# Patient Record
Sex: MALE | Race: WHITE | Employment: FULL TIME | URBAN - METROPOLITAN AREA
[De-identification: names, ages, dates, MRNs, and addresses within clinical notes are randomized per-mention and may not be internally consistent; named-entity substitution may affect disease eponyms.]

---

## 2021-12-25 ENCOUNTER — APPOINTMENT (OUTPATIENT)
Dept: GENERAL RADIOLOGY | Age: 66
DRG: 175 | End: 2021-12-25
Payer: MEDICARE

## 2021-12-25 ENCOUNTER — HOSPITAL ENCOUNTER (INPATIENT)
Age: 66
LOS: 2 days | Discharge: HOME | DRG: 175 | End: 2021-12-27
Attending: EMERGENCY MEDICINE | Admitting: INTERNAL MEDICINE
Payer: MEDICARE

## 2021-12-25 ENCOUNTER — APPOINTMENT (OUTPATIENT)
Dept: CT IMAGING | Age: 66
DRG: 175 | End: 2021-12-25
Payer: MEDICARE

## 2021-12-25 DIAGNOSIS — I26.94 MULTIPLE SUBSEGMENTAL PULMONARY EMBOLI WITHOUT ACUTE COR PULMONALE (HCC): ICD-10-CM

## 2021-12-25 DIAGNOSIS — R06.02 SHORTNESS OF BREATH: Primary | ICD-10-CM

## 2021-12-25 PROBLEM — I26.99 PULMONARY EMBOLISM (HCC): Status: ACTIVE | Noted: 2021-12-25

## 2021-12-25 LAB
ALBUMIN SERPL-MCNC: 4.8 G/DL (ref 3.5–5.2)
ALP BLD-CCNC: 91 U/L (ref 40–129)
ALT SERPL-CCNC: 22 U/L (ref 0–40)
ANION GAP SERPL CALCULATED.3IONS-SCNC: 14 MMOL/L (ref 7–16)
AST SERPL-CCNC: 20 U/L (ref 0–39)
BASOPHILS ABSOLUTE: 0.05 E9/L (ref 0–0.2)
BASOPHILS RELATIVE PERCENT: 0.4 % (ref 0–2)
BILIRUB SERPL-MCNC: 0.4 MG/DL (ref 0–1.2)
BUN BLDV-MCNC: 15 MG/DL (ref 6–23)
CALCIUM SERPL-MCNC: 9.8 MG/DL (ref 8.6–10.2)
CHLORIDE BLD-SCNC: 98 MMOL/L (ref 98–107)
CO2: 26 MMOL/L (ref 22–29)
CREAT SERPL-MCNC: 1 MG/DL (ref 0.7–1.2)
EOSINOPHILS ABSOLUTE: 0.14 E9/L (ref 0.05–0.5)
EOSINOPHILS RELATIVE PERCENT: 1.1 % (ref 0–6)
GFR AFRICAN AMERICAN: >60
GFR NON-AFRICAN AMERICAN: >60 ML/MIN/1.73
GLUCOSE BLD-MCNC: 119 MG/DL (ref 74–99)
HCT VFR BLD CALC: 44.4 % (ref 37–54)
HEMOGLOBIN: 14.5 G/DL (ref 12.5–16.5)
IMMATURE GRANULOCYTES #: 0.05 E9/L
IMMATURE GRANULOCYTES %: 0.4 % (ref 0–5)
LIPASE: 20 U/L (ref 13–60)
LYMPHOCYTES ABSOLUTE: 2.61 E9/L (ref 1.5–4)
LYMPHOCYTES RELATIVE PERCENT: 20.6 % (ref 20–42)
MCH RBC QN AUTO: 29.4 PG (ref 26–35)
MCHC RBC AUTO-ENTMCNC: 32.7 % (ref 32–34.5)
MCV RBC AUTO: 90.1 FL (ref 80–99.9)
MONOCYTES ABSOLUTE: 1.27 E9/L (ref 0.1–0.95)
MONOCYTES RELATIVE PERCENT: 10 % (ref 2–12)
NEUTROPHILS ABSOLUTE: 8.56 E9/L (ref 1.8–7.3)
NEUTROPHILS RELATIVE PERCENT: 67.5 % (ref 43–80)
PDW BLD-RTO: 12.6 FL (ref 11.5–15)
PLATELET # BLD: 380 E9/L (ref 130–450)
PMV BLD AUTO: 9.8 FL (ref 7–12)
POTASSIUM REFLEX MAGNESIUM: 3.9 MMOL/L (ref 3.5–5)
PRO-BNP: 70 PG/ML (ref 0–125)
RBC # BLD: 4.93 E12/L (ref 3.8–5.8)
SARS-COV-2, NAAT: NOT DETECTED
SODIUM BLD-SCNC: 138 MMOL/L (ref 132–146)
TOTAL PROTEIN: 8 G/DL (ref 6.4–8.3)
TROPONIN, HIGH SENSITIVITY: 7 NG/L (ref 0–11)
TROPONIN, HIGH SENSITIVITY: 7 NG/L (ref 0–11)
WBC # BLD: 12.7 E9/L (ref 4.5–11.5)

## 2021-12-25 PROCEDURE — 6360000004 HC RX CONTRAST MEDICATION: Performed by: RADIOLOGY

## 2021-12-25 PROCEDURE — 2060000000 HC ICU INTERMEDIATE R&B

## 2021-12-25 PROCEDURE — 96376 TX/PRO/DX INJ SAME DRUG ADON: CPT

## 2021-12-25 PROCEDURE — 6370000000 HC RX 637 (ALT 250 FOR IP)

## 2021-12-25 PROCEDURE — 83880 ASSAY OF NATRIURETIC PEPTIDE: CPT

## 2021-12-25 PROCEDURE — 6360000002 HC RX W HCPCS

## 2021-12-25 PROCEDURE — 83690 ASSAY OF LIPASE: CPT

## 2021-12-25 PROCEDURE — 80053 COMPREHEN METABOLIC PANEL: CPT

## 2021-12-25 PROCEDURE — 36415 COLL VENOUS BLD VENIPUNCTURE: CPT

## 2021-12-25 PROCEDURE — 84484 ASSAY OF TROPONIN QUANT: CPT

## 2021-12-25 PROCEDURE — 2580000003 HC RX 258: Performed by: PHYSICIAN ASSISTANT

## 2021-12-25 PROCEDURE — 94664 DEMO&/EVAL PT USE INHALER: CPT

## 2021-12-25 PROCEDURE — 71275 CT ANGIOGRAPHY CHEST: CPT

## 2021-12-25 PROCEDURE — 99284 EMERGENCY DEPT VISIT MOD MDM: CPT

## 2021-12-25 PROCEDURE — 71045 X-RAY EXAM CHEST 1 VIEW: CPT

## 2021-12-25 PROCEDURE — 87635 SARS-COV-2 COVID-19 AMP PRB: CPT

## 2021-12-25 PROCEDURE — 96372 THER/PROPH/DIAG INJ SC/IM: CPT

## 2021-12-25 PROCEDURE — 6370000000 HC RX 637 (ALT 250 FOR IP): Performed by: PHYSICIAN ASSISTANT

## 2021-12-25 PROCEDURE — 85025 COMPLETE CBC W/AUTO DIFF WBC: CPT

## 2021-12-25 PROCEDURE — 96374 THER/PROPH/DIAG INJ IV PUSH: CPT

## 2021-12-25 PROCEDURE — 94640 AIRWAY INHALATION TREATMENT: CPT

## 2021-12-25 RX ORDER — LEVOTHYROXINE SODIUM 112 UG/1
112 TABLET ORAL DAILY
Status: DISCONTINUED | OUTPATIENT
Start: 2021-12-26 | End: 2021-12-27 | Stop reason: HOSPADM

## 2021-12-25 RX ORDER — SODIUM CHLORIDE 0.9 % (FLUSH) 0.9 %
10 SYRINGE (ML) INJECTION PRN
Status: DISCONTINUED | OUTPATIENT
Start: 2021-12-25 | End: 2021-12-27 | Stop reason: HOSPADM

## 2021-12-25 RX ORDER — ESOMEPRAZOLE MAGNESIUM 20 MG/1
20 FOR SUSPENSION ORAL DAILY
COMMUNITY

## 2021-12-25 RX ORDER — HYDRALAZINE HYDROCHLORIDE 20 MG/ML
10 INJECTION INTRAMUSCULAR; INTRAVENOUS EVERY 6 HOURS PRN
Status: DISCONTINUED | OUTPATIENT
Start: 2021-12-25 | End: 2021-12-27 | Stop reason: HOSPADM

## 2021-12-25 RX ORDER — SODIUM CHLORIDE 0.9 % (FLUSH) 0.9 %
10 SYRINGE (ML) INJECTION EVERY 12 HOURS SCHEDULED
Status: DISCONTINUED | OUTPATIENT
Start: 2021-12-25 | End: 2021-12-27 | Stop reason: HOSPADM

## 2021-12-25 RX ORDER — ROSUVASTATIN CALCIUM 10 MG/1
10 TABLET, COATED ORAL DAILY
Status: DISCONTINUED | OUTPATIENT
Start: 2021-12-25 | End: 2021-12-27 | Stop reason: HOSPADM

## 2021-12-25 RX ORDER — ASPIRIN 81 MG/1
81 TABLET, CHEWABLE ORAL DAILY
Status: DISCONTINUED | OUTPATIENT
Start: 2021-12-25 | End: 2021-12-27 | Stop reason: HOSPADM

## 2021-12-25 RX ORDER — ACETAMINOPHEN 325 MG/1
650 TABLET ORAL EVERY 6 HOURS PRN
Status: DISCONTINUED | OUTPATIENT
Start: 2021-12-25 | End: 2021-12-27 | Stop reason: HOSPADM

## 2021-12-25 RX ORDER — OMEGA-3 FATTY ACIDS CAP DELAYED RELEASE 1000 MG 1000 MG
1000 CAPSULE DELAYED RELEASE ORAL
COMMUNITY

## 2021-12-25 RX ORDER — FENTANYL CITRATE 50 UG/ML
50 INJECTION, SOLUTION INTRAMUSCULAR; INTRAVENOUS ONCE
Status: COMPLETED | OUTPATIENT
Start: 2021-12-25 | End: 2021-12-25

## 2021-12-25 RX ORDER — FENTANYL CITRATE 50 UG/ML
INJECTION, SOLUTION INTRAMUSCULAR; INTRAVENOUS
Status: COMPLETED
Start: 2021-12-25 | End: 2021-12-25

## 2021-12-25 RX ORDER — ROSUVASTATIN CALCIUM 10 MG/1
10 TABLET, COATED ORAL DAILY
COMMUNITY

## 2021-12-25 RX ORDER — IPRATROPIUM BROMIDE AND ALBUTEROL SULFATE 2.5; .5 MG/3ML; MG/3ML
1 SOLUTION RESPIRATORY (INHALATION)
Status: COMPLETED | OUTPATIENT
Start: 2021-12-25 | End: 2021-12-25

## 2021-12-25 RX ORDER — SODIUM CHLORIDE 9 MG/ML
25 INJECTION, SOLUTION INTRAVENOUS PRN
Status: DISCONTINUED | OUTPATIENT
Start: 2021-12-25 | End: 2021-12-27 | Stop reason: HOSPADM

## 2021-12-25 RX ORDER — BUDESONIDE AND FORMOTEROL FUMARATE DIHYDRATE 80; 4.5 UG/1; UG/1
2 AEROSOL RESPIRATORY (INHALATION) 2 TIMES DAILY
Status: DISCONTINUED | OUTPATIENT
Start: 2021-12-25 | End: 2021-12-27 | Stop reason: HOSPADM

## 2021-12-25 RX ORDER — LEVOTHYROXINE SODIUM 112 UG/1
112 TABLET ORAL DAILY
COMMUNITY

## 2021-12-25 RX ORDER — POTASSIUM CHLORIDE 7.45 MG/ML
10 INJECTION INTRAVENOUS PRN
Status: DISCONTINUED | OUTPATIENT
Start: 2021-12-25 | End: 2021-12-27 | Stop reason: HOSPADM

## 2021-12-25 RX ORDER — BUDESONIDE AND FORMOTEROL FUMARATE DIHYDRATE 80; 4.5 UG/1; UG/1
2 AEROSOL RESPIRATORY (INHALATION) 2 TIMES DAILY
COMMUNITY

## 2021-12-25 RX ORDER — ACETAMINOPHEN 650 MG/1
650 SUPPOSITORY RECTAL EVERY 6 HOURS PRN
Status: DISCONTINUED | OUTPATIENT
Start: 2021-12-25 | End: 2021-12-27 | Stop reason: HOSPADM

## 2021-12-25 RX ORDER — LANOLIN ALCOHOL/MO/W.PET/CERES
3 CREAM (GRAM) TOPICAL NIGHTLY PRN
Status: DISCONTINUED | OUTPATIENT
Start: 2021-12-25 | End: 2021-12-27 | Stop reason: HOSPADM

## 2021-12-25 RX ORDER — POTASSIUM CHLORIDE 20 MEQ/1
40 TABLET, EXTENDED RELEASE ORAL PRN
Status: DISCONTINUED | OUTPATIENT
Start: 2021-12-25 | End: 2021-12-27 | Stop reason: HOSPADM

## 2021-12-25 RX ORDER — ASPIRIN 81 MG/1
81 TABLET, CHEWABLE ORAL DAILY
COMMUNITY

## 2021-12-25 RX ADMIN — IPRATROPIUM BROMIDE AND ALBUTEROL SULFATE 1 AMPULE: .5; 3 SOLUTION RESPIRATORY (INHALATION) at 14:25

## 2021-12-25 RX ADMIN — IPRATROPIUM BROMIDE AND ALBUTEROL SULFATE 1 AMPULE: .5; 3 SOLUTION RESPIRATORY (INHALATION) at 14:24

## 2021-12-25 RX ADMIN — FENTANYL CITRATE 50 MCG: 50 INJECTION, SOLUTION INTRAMUSCULAR; INTRAVENOUS at 12:01

## 2021-12-25 RX ADMIN — FENTANYL CITRATE 50 MCG: 50 INJECTION INTRAMUSCULAR; INTRAVENOUS at 12:01

## 2021-12-25 RX ADMIN — FENTANYL CITRATE 50 MCG: 50 INJECTION INTRAMUSCULAR; INTRAVENOUS at 12:41

## 2021-12-25 RX ADMIN — Medication 10 ML: at 20:59

## 2021-12-25 RX ADMIN — ROSUVASTATIN CALCIUM 10 MG: 10 TABLET, FILM COATED ORAL at 20:59

## 2021-12-25 RX ADMIN — IPRATROPIUM BROMIDE AND ALBUTEROL SULFATE 1 AMPULE: .5; 3 SOLUTION RESPIRATORY (INHALATION) at 14:23

## 2021-12-25 RX ADMIN — ENOXAPARIN SODIUM 100 MG: 100 INJECTION SUBCUTANEOUS at 13:11

## 2021-12-25 RX ADMIN — IOPAMIDOL 80 ML: 755 INJECTION, SOLUTION INTRAVENOUS at 12:18

## 2021-12-25 RX ADMIN — BUDESONIDE AND FORMOTEROL FUMARATE DIHYDRATE 2 PUFF: 80; 4.5 AEROSOL RESPIRATORY (INHALATION) at 22:58

## 2021-12-25 ASSESSMENT — ENCOUNTER SYMPTOMS
SHORTNESS OF BREATH: 1
ABDOMINAL DISTENTION: 0
BACK PAIN: 0
ABDOMINAL PAIN: 0
NAUSEA: 0
TROUBLE SWALLOWING: 0
EYE REDNESS: 0
DIARRHEA: 0
VOMITING: 0
WHEEZING: 0
CONSTIPATION: 0
CHEST TIGHTNESS: 0
RHINORRHEA: 0
EYE ITCHING: 0

## 2021-12-25 ASSESSMENT — PAIN DESCRIPTION - LOCATION: LOCATION: CHEST

## 2021-12-25 ASSESSMENT — PAIN SCALES - GENERAL
PAINLEVEL_OUTOF10: 10

## 2021-12-25 ASSESSMENT — PAIN DESCRIPTION - PAIN TYPE: TYPE: ACUTE PAIN

## 2021-12-25 ASSESSMENT — PAIN DESCRIPTION - ORIENTATION: ORIENTATION: LEFT

## 2021-12-25 NOTE — ED NOTES
Radiology Procedure Waiver   Name: Erika Dawson  : 1955  MRN: [id number]    Date:  21    Time: 12:03 PM EST    Benefits of immediately proceeding with Radiology exam(s) without pre-testing outweigh the risks or are not indicated as specified below and therefore the following is/are being waived:    [] Pregnancy test   [] Patients LMP on-time and regular.   [] Patient had Tubal Ligation or has other Contraception Device. [] Patient  is Menopausal or Premenarcheal.    [] Patient had Full or Partial Hysterectomy. [] Protocol for Iodine allergy    [] MRI Questionnaire     [] BUN/Creatinine   [x] Patient age w/no hx of renal dysfunction. [] Patient on Dialysis. [] Recent Normal Labs.   Electronically signed by Stephanie Nation DO on 21 at 12:03 PM EST          Emergent CTA for respiratory distress     Stephanie Nation DO  [phone]

## 2021-12-25 NOTE — ED PROVIDER NOTES
Isabel Merritt is a 73 y.o. male    Chief Complaint   Patient presents with    Chest Pain     rapid onset at approximately 1000. pain is worse with inspiration and breathing    Shortness of Breath         HPI   Isabel Merritt is a 73 y.o. male presenting to the ED for Chest Pain (rapid onset at approximately 1000. pain is worse with inspiration and breathing) and Shortness of Breath    Patient presented to the emergency room with sudden onset chest pain starting around 10 AM this morning. The pain is inferior to the pectoral muscle and slightly lateral.  Pain is worse with inspiration and movements. Patient is frequently tensing his entire body up during his spasms of pain. He is breathing rapidly though he is generally able to answer questions. This is never happened to the patient before in general he has no significant medical problems related to chest pain shortness of breath. He has never had blood clots before, no recent travel or periods of immobility. Patient has a history of prostate cancer, thyroid disease. Symptoms began today with severe severity, and is persistent. No other associated symptoms no other modifying factors. History comes primarily from the patient. On arrival, the patient was assessed by history, physical exam, imaging studies, laboratory studies and ekg, vital signs. Vital signs were hypertensive and the patient was afebrile. Review of Systems   Constitutional: Negative for appetite change, fatigue and fever. HENT: Negative for congestion, rhinorrhea and trouble swallowing. Eyes: Negative for redness and itching. Respiratory: Positive for shortness of breath. Negative for chest tightness and wheezing. Cardiovascular: Positive for chest pain. Negative for palpitations and leg swelling. Gastrointestinal: Negative for abdominal distention, abdominal pain, constipation, diarrhea, nausea and vomiting.    Genitourinary: Negative for decreased urine volume, difficulty urinating and frequency. Musculoskeletal: Negative for arthralgias, back pain and myalgias. Neurological: Negative for dizziness, syncope, weakness, numbness and headaches. Psychiatric/Behavioral: Negative for agitation, behavioral problems, confusion and decreased concentration. The patient is not nervous/anxious. All other systems reviewed and are negative. Physical Exam  Vitals reviewed. Constitutional:       General: He is in acute distress. Appearance: Normal appearance. He is well-developed. He is not ill-appearing or toxic-appearing. HENT:      Head: Normocephalic and atraumatic. Nose: Nose normal. No congestion or rhinorrhea. Mouth/Throat:      Mouth: Mucous membranes are moist.      Pharynx: Oropharynx is clear. No oropharyngeal exudate or posterior oropharyngeal erythema. Eyes:      Extraocular Movements: Extraocular movements intact. Conjunctiva/sclera: Conjunctivae normal.      Pupils: Pupils are equal, round, and reactive to light. Cardiovascular:      Rate and Rhythm: Normal rate and regular rhythm. Heart sounds: Normal heart sounds. No murmur heard. Pulmonary:      Effort: Tachypnea, accessory muscle usage and respiratory distress present. Breath sounds: Decreased breath sounds and wheezing present. No rhonchi. Chest:      Chest wall: Tenderness present. Abdominal:      General: Abdomen is flat. There is no distension. Tenderness: There is no abdominal tenderness. There is no guarding. Musculoskeletal:         General: No swelling or tenderness. Normal range of motion. Cervical back: Normal range of motion. No rigidity or tenderness. Skin:     General: Skin is warm and dry. Coloration: Skin is not jaundiced or pale. Findings: No bruising or erythema. Neurological:      General: No focal deficit present. Mental Status: He is alert and oriented to person, place, and time. Cranial Nerves:  No cranial nerve deficit. Motor: No weakness. Psychiatric:         Mood and Affect: Mood normal.         Behavior: Behavior normal.         Thought Content: Thought content normal.          Procedures     MDM   Patient presented to the Emergency Department for Chest Pain (rapid onset at approximately 1000. pain is worse with inspiration and breathing) and Shortness of Breath    Patient presents with sudden onset chest pain which started at 10 AM this morning associated with significant shortness of breath. Labs:  Patient's labs are unremarkable including negative troponin and BNP. They are otherwise unremarkable except for mildly elevated white count of 12.7    Imaging:  Chest x-ray shows no acute process. CTA shows small subsegmental pulmonary embolisms. While in the emergency room, the patient received 250 mcg doses of fentanyl for his pain which seems significant and was not being relieved. He also received a DuoNeb for his breathing and after discovery of her pulmonary embolism was given a Lovenox injection. The patient is generally more comfortable now, breathing much easier and not in nearly as much distress. EKG was done and seen by the attending. ED Course as of 12/25/21 1924   Sat Dec 25, 2021   1235 Presents from home with chest pain starting this morning. The patient points to approximately mid axillary line at the level of the xiphoid process. The pain is worse with movement and inspiration. The pain has progressively worsened throughout the morning. [KS]      ED Course User Index  [KS] Dolly Quevedo MD       --------------------------------------------- PAST HISTORY ---------------------------------------------  Past Medical History:  has a past medical history of Cancer ([facility] [address] Hyperlipidemia, and Thyroid disease. Past Surgical History:  has no past surgical history on file. Social History:  reports that he has never smoked.  He has never used smokeless tobacco. He reports that he does not drink alcohol and does not use drugs. Family History: family history is not on file. The patients home medications have been reviewed. Allergies: Patient has no known allergies.     -------------------------------------------------- RESULTS -------------------------------------------------    LABS:  Results for orders placed or performed during the hospital encounter of 12/25/21   CBC Auto Differential   Result Value Ref Range    WBC 12.7 (H) 4.5 - 11.5 E9/L    RBC 4.93 3.80 - 5.80 E12/L    Hemoglobin 14.5 12.5 - 16.5 g/dL    Hematocrit 44.4 37.0 - 54.0 %    MCV 90.1 80.0 - 99.9 fL    MCH 29.4 26.0 - 35.0 pg    MCHC 32.7 32.0 - 34.5 %    RDW 12.6 11.5 - 15.0 fL    Platelets 451 158 - 320 E9/L    MPV 9.8 7.0 - 12.0 fL    Neutrophils % 67.5 43.0 - 80.0 %    Immature Granulocytes % 0.4 0.0 - 5.0 %    Lymphocytes % 20.6 20.0 - 42.0 %    Monocytes % 10.0 2.0 - 12.0 %    Eosinophils % 1.1 0.0 - 6.0 %    Basophils % 0.4 0.0 - 2.0 %    Neutrophils Absolute 8.56 (H) 1.80 - 7.30 E9/L    Immature Granulocytes # 0.05 E9/L    Lymphocytes Absolute 2.61 1.50 - 4.00 E9/L    Monocytes Absolute 1.27 (H) 0.10 - 0.95 E9/L    Eosinophils Absolute 0.14 0.05 - 0.50 E9/L    Basophils Absolute 0.05 0.00 - 0.20 E9/L   Comprehensive Metabolic Panel w/ Reflex to MG   Result Value Ref Range    Sodium 138 132 - 146 mmol/L    Potassium reflex Magnesium 3.9 3.5 - 5.0 mmol/L    Chloride 98 98 - 107 mmol/L    CO2 26 22 - 29 mmol/L    Anion Gap 14 7 - 16 mmol/L    Glucose 119 (H) 74 - 99 mg/dL    BUN 15 6 - 23 mg/dL    CREATININE 1.0 0.7 - 1.2 mg/dL    GFR Non-African American >60 >=60 mL/min/1.73    GFR African American >60     Calcium 9.8 8.6 - 10.2 mg/dL    Total Protein 8.0 6.4 - 8.3 g/dL    Albumin 4.8 3.5 - 5.2 g/dL    Total Bilirubin 0.4 0.0 - 1.2 mg/dL    Alkaline Phosphatase 91 40 - 129 U/L    ALT 22 0 - 40 U/L    AST 20 0 - 39 U/L   Lipase   Result Value Ref Range    Lipase 20 13 - 60 U/L   Troponin Result Value Ref Range    Troponin, High Sensitivity 7 0 - 11 ng/L   Brain Natriuretic Peptide   Result Value Ref Range    Pro-BNP 70 0 - 125 pg/mL   Troponin   Result Value Ref Range    Troponin, High Sensitivity 7 0 - 11 ng/L       RADIOLOGY:  CTA PULMONARY W CONTRAST   Final Result   1. Findings suspicious for small pulmonary emboli in subsegmental branches of   the right and left lower lobe pulmonary arteries. 2. Right hilar and subcarinal lymphadenopathy. This could be reactive or   related to metastatic disease. Correlation with PET-CT scan may be helpful. 3. Multiple ground-glass opacities in the right lung as described above with   a small 1.2 cm part solid nodule in the superior segment of the right lower   lobe. The findings are suspicious for inflammatory process. The possibility   of post radiation changes could also be considered. Possibility of COVID-19   pneumonia should also be considered although this appearance is not typical   for COVID-19 changes. 4. The 1.2 cm nodule in the superior segment of the right lower lobe may be   inflammatory although metastatic disease could also be considered. This   could also be evaluated with PET-CT. 5. 2 low-density lesions in the liver measuring 1.7 cm that could represent   metastatic disease. 6. Tiny left pleural effusion. Mild atelectasis in the left lower lobe and   lingula. RECOMMENDATIONS:   Unavailable         XR CHEST PORTABLE   Final Result   No acute process. ------------------------- NURSING NOTES AND VITALS REVIEWED ---------------------------  Date / Time Roomed:  12/25/2021 11:53 AM  ED Bed Assignment:  20/20    The nursing notes within the ED encounter and vital signs as below have been reviewed.      Patient Vitals for the past 24 hrs:   BP Temp Temp src Pulse Resp SpO2 Height Weight   12/25/21 1434 (!) 145/83   86  97 %     12/25/21 1425      96 %     12/25/21 1424      96 %     12/25/21 Osvaldo Ingram 42 %     12/25/21 1242 (!) 159/93   88 24 92 %     12/25/21 1219 (!) 159/93 99.1 °F (37.3 °C) Oral 94 26 94 % 5' 9\" (1.753 m) 215 lb (97.5 kg)       Oxygen Saturation Interpretation: Normal    ------------------------------------------ PROGRESS NOTES ------------------------------------------  Re-evaluation(s):  Time: Multiple reevaluation patients symptoms are improving  Repeat physical examination is improved    Counseling:  I have spoken with the patient and discussed todays results, in addition to providing specific details for the plan of care and counseling regarding the diagnosis and prognosis. Their questions are answered at this time and they are agreeable with the plan of admission.    --------------------------------- ADDITIONAL PROVIDER NOTES ---------------------------------  Consultations:  Time: 330. Spoke with Andrés. Discussed case. They will admit the patient. This patient's ED course included: a personal history and physicial examination, multiple bedside re-evaluations, IV medications, cardiac monitoring, continuous pulse oximetry and complex medical decision making and emergency management    This patient has remained hemodynamically stable during their ED course. Diagnosis:  1. Shortness of breath    2. Multiple subsegmental pulmonary emboli without acute cor pulmonale (HCC)        Disposition:  Patient's disposition: Admit to med/surg floor with observation  Patient's condition is stable.            Angeline Cuba MD  Resident  12/28/21 [id number]

## 2021-12-26 ENCOUNTER — APPOINTMENT (OUTPATIENT)
Dept: ULTRASOUND IMAGING | Age: 66
DRG: 175 | End: 2021-12-26
Payer: MEDICARE

## 2021-12-26 LAB
ADENOVIRUS BY PCR: NOT DETECTED
ANION GAP SERPL CALCULATED.3IONS-SCNC: 13 MMOL/L (ref 7–16)
BASOPHILS ABSOLUTE: 0.03 E9/L (ref 0–0.2)
BASOPHILS RELATIVE PERCENT: 0.3 % (ref 0–2)
BORDETELLA PARAPERTUSSIS BY PCR: NOT DETECTED
BORDETELLA PERTUSSIS BY PCR: NOT DETECTED
BUN BLDV-MCNC: 12 MG/DL (ref 6–23)
CALCIUM SERPL-MCNC: 9.3 MG/DL (ref 8.6–10.2)
CHLAMYDOPHILIA PNEUMONIAE BY PCR: NOT DETECTED
CHLORIDE BLD-SCNC: 98 MMOL/L (ref 98–107)
CO2: 25 MMOL/L (ref 22–29)
CORONAVIRUS 229E BY PCR: NOT DETECTED
CORONAVIRUS HKU1 BY PCR: NOT DETECTED
CORONAVIRUS NL63 BY PCR: NOT DETECTED
CORONAVIRUS OC43 BY PCR: NOT DETECTED
CREAT SERPL-MCNC: 0.9 MG/DL (ref 0.7–1.2)
EOSINOPHILS ABSOLUTE: 0.06 E9/L (ref 0.05–0.5)
EOSINOPHILS RELATIVE PERCENT: 0.6 % (ref 0–6)
GFR AFRICAN AMERICAN: >60
GFR NON-AFRICAN AMERICAN: >60 ML/MIN/1.73
GLUCOSE BLD-MCNC: 137 MG/DL (ref 74–99)
HCT VFR BLD CALC: 40.1 % (ref 37–54)
HEMOGLOBIN: 13.4 G/DL (ref 12.5–16.5)
HUMAN METAPNEUMOVIRUS BY PCR: NOT DETECTED
HUMAN RHINOVIRUS/ENTEROVIRUS BY PCR: NOT DETECTED
IMMATURE GRANULOCYTES #: 0.03 E9/L
IMMATURE GRANULOCYTES %: 0.3 % (ref 0–5)
INFLUENZA A BY PCR: NOT DETECTED
INFLUENZA B BY PCR: NOT DETECTED
LYMPHOCYTES ABSOLUTE: 1.44 E9/L (ref 1.5–4)
LYMPHOCYTES RELATIVE PERCENT: 13.2 % (ref 20–42)
MCH RBC QN AUTO: 29.9 PG (ref 26–35)
MCHC RBC AUTO-ENTMCNC: 33.4 % (ref 32–34.5)
MCV RBC AUTO: 89.5 FL (ref 80–99.9)
MONOCYTES ABSOLUTE: 1.33 E9/L (ref 0.1–0.95)
MONOCYTES RELATIVE PERCENT: 12.2 % (ref 2–12)
MYCOPLASMA PNEUMONIAE BY PCR: NOT DETECTED
NEUTROPHILS ABSOLUTE: 8.01 E9/L (ref 1.8–7.3)
NEUTROPHILS RELATIVE PERCENT: 73.4 % (ref 43–80)
PARAINFLUENZA VIRUS 1 BY PCR: NOT DETECTED
PARAINFLUENZA VIRUS 2 BY PCR: NOT DETECTED
PARAINFLUENZA VIRUS 3 BY PCR: NOT DETECTED
PARAINFLUENZA VIRUS 4 BY PCR: NOT DETECTED
PDW BLD-RTO: 12.9 FL (ref 11.5–15)
PLATELET # BLD: 314 E9/L (ref 130–450)
PMV BLD AUTO: 9.8 FL (ref 7–12)
POTASSIUM REFLEX MAGNESIUM: 4.3 MMOL/L (ref 3.5–5)
RBC # BLD: 4.48 E12/L (ref 3.8–5.8)
RESPIRATORY SYNCYTIAL VIRUS BY PCR: NOT DETECTED
SARS-COV-2, PCR: NOT DETECTED
SODIUM BLD-SCNC: 136 MMOL/L (ref 132–146)
WBC # BLD: 10.9 E9/L (ref 4.5–11.5)

## 2021-12-26 PROCEDURE — 93970 EXTREMITY STUDY: CPT

## 2021-12-26 PROCEDURE — 2700000000 HC OXYGEN THERAPY PER DAY

## 2021-12-26 PROCEDURE — 6360000002 HC RX W HCPCS: Performed by: PHYSICIAN ASSISTANT

## 2021-12-26 PROCEDURE — 6370000000 HC RX 637 (ALT 250 FOR IP): Performed by: PHYSICIAN ASSISTANT

## 2021-12-26 PROCEDURE — 82378 CARCINOEMBRYONIC ANTIGEN: CPT

## 2021-12-26 PROCEDURE — 80048 BASIC METABOLIC PNL TOTAL CA: CPT

## 2021-12-26 PROCEDURE — 86301 IMMUNOASSAY TUMOR CA 19-9: CPT

## 2021-12-26 PROCEDURE — 2060000000 HC ICU INTERMEDIATE R&B

## 2021-12-26 PROCEDURE — 85025 COMPLETE CBC W/AUTO DIFF WBC: CPT

## 2021-12-26 PROCEDURE — 2580000003 HC RX 258: Performed by: PHYSICIAN ASSISTANT

## 2021-12-26 PROCEDURE — 0202U NFCT DS 22 TRGT SARS-COV-2: CPT

## 2021-12-26 PROCEDURE — 36415 COLL VENOUS BLD VENIPUNCTURE: CPT

## 2021-12-26 PROCEDURE — 84153 ASSAY OF PSA TOTAL: CPT

## 2021-12-26 RX ADMIN — ROSUVASTATIN CALCIUM 10 MG: 10 TABLET, FILM COATED ORAL at 09:04

## 2021-12-26 RX ADMIN — Medication 10 ML: at 09:04

## 2021-12-26 RX ADMIN — BUDESONIDE AND FORMOTEROL FUMARATE DIHYDRATE 2 PUFF: 80; 4.5 AEROSOL RESPIRATORY (INHALATION) at 09:04

## 2021-12-26 RX ADMIN — Medication 10 ML: at 20:23

## 2021-12-26 RX ADMIN — ENOXAPARIN SODIUM 100 MG: 100 INJECTION SUBCUTANEOUS at 09:04

## 2021-12-26 RX ADMIN — ENOXAPARIN SODIUM 100 MG: 100 INJECTION SUBCUTANEOUS at 00:15

## 2021-12-26 RX ADMIN — ASPIRIN 81 MG CHEWABLE TABLET 81 MG: 81 TABLET CHEWABLE at 09:04

## 2021-12-26 RX ADMIN — ENOXAPARIN SODIUM 100 MG: 100 INJECTION SUBCUTANEOUS at 20:23

## 2021-12-26 RX ADMIN — LEVOTHYROXINE SODIUM 112 MCG: 0.11 TABLET ORAL at 05:58

## 2021-12-26 RX ADMIN — BUDESONIDE AND FORMOTEROL FUMARATE DIHYDRATE 2 PUFF: 80; 4.5 AEROSOL RESPIRATORY (INHALATION) at 20:23

## 2021-12-26 ASSESSMENT — PAIN SCALES - GENERAL
PAINLEVEL_OUTOF10: 0
PAINLEVEL_OUTOF10: 2
PAINLEVEL_OUTOF10: 0
PAINLEVEL_OUTOF10: 0

## 2021-12-26 NOTE — PLAN OF CARE
Problem: Tissue Perfusion - Cardiopulmonary, Altered:  Goal: Absence of angina  Description: Absence of angina  Outcome: Ongoing  Goal: Hemodynamic stability will improve  Description: Hemodynamic stability will improve  Outcome: Ongoing

## 2021-12-26 NOTE — CONSULTS
Pulmonary Consultation    Admit Date: 12/25/2021  Requesting Physician: Felicita Daly MD    CC:  Chest pressure    HPI:  73 YOM, recently new patient to Dr. Earl Lockwood. Was sent from PCP to lung cancer screening scan. Abnormal with nodules 1.2cm RLL and node right neck. Was evaluated by Dr. Debbie Sampson chest 8/23/21 3 nodules on right 10.8,11.2, and 1 pleural nodule. Paratracheal lymph node 33.3x18.6 and right hilar lymph node 24.8x15.3, and enlarged mediastinal nodes. Ordered and completed a PET 10/29/21 uptake + superior mediastinal, paratracheal and cervical chain. PFT with moderate obstruction and + BD. Patient seen Dr. Natan Khan and had a biopsy one week ago at [facility]-no results as of yet per patient and I am unable to visualize records . He was started on Symbicort one month ago and does notice a difference in his breathing . No oxygen use. Smoked for 40+ years and stopped 5 months ago. 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 He is vaccinated x 3. Marries with one daughter in [address] [place] [address] Road and a step daughter with autism . He lives in [place], [place]. H/o prostate cancer s/p resection    He developed chest tightness Elmer Keith. Went to visit his mother in [facility] day, chest tightness progressed and worsened to left chest. With the inability to take a deep breath . He had his LIZETTE bring him to closest hospital here, [facility].     He is more comfortable today, minimal chest discomfort on 2L O2,      PMH:    Past Medical History:   Diagnosis Date    Cancer [facility])     prostate     Hyperlipidemia     Thyroid disease      PSH:  History reviewed. No pertinent surgical history. Respiratory ROS: intermittent left sided chest pain and decreased inspiratory effort Otherwise, a complete review of systems is undertaken and is negative.     Social History:  Social History     Socioeconomic History    Marital status:  Spouse name: Not on file    Number of children: Not on file    Years of education: Not on file    Highest education level: Not on file   Occupational History    Not on file   Tobacco Use    Smoking status: Never Smoker    Smokeless tobacco: Never Used   Substance and Sexual Activity    Alcohol use: Never    Drug use: Never    Sexual activity: Not on file   Other Topics Concern    Not on file   Social History Narrative    Not on file     Social Determinants of Health     Financial Resource Strain:     Difficulty of Paying Living Expenses: Not on file   Food Insecurity:     Worried About Running Out of Food in the Last Year: Not on file    Titi of Food in the Last Year: Not on file   Transportation Needs:     Lack of Transportation (Medical): Not on file    Lack of Transportation (Non-Medical):  Not on file   Physical Activity:     Days of Exercise per Week: Not on file    Minutes of Exercise per Session: Not on file   Stress:     Feeling of Stress : Not on file   Social Connections:     Frequency of Communication with Friends and Family: Not on file    Frequency of Social Gatherings with Friends and Family: Not on file    Attends Zoroastrian Services: Not on file    Active Member of [address] [place] [address] or Organizations: Not on file    Attends Club or Organization Meetings: Not on file    Marital Status: Not on file   Intimate Partner Violence:     Fear of Current or Ex-Partner: Not on file    Emotionally Abused: Not on file    Physically Abused: Not on file    Sexually Abused: Not on file   Housing Stability:     Unable to Pay for Housing in the Last Year: Not on file    Number of [place] in the Last Year: Not on file    Unstable Housing in the Last Year: Not on file       Family History:  Mom and dad with skin cancer  Aunt with lung cancer     Medications:     sodium chloride        aspirin  81 mg Oral Daily    budesonide-formoterol  2 puff Inhalation BID    levothyroxine  112 mcg Oral Daily    rosuvastatin  10 mg Oral Daily    sodium chloride flush  10 mL IntraVENous 2 times per day    enoxaparin  1 mg/kg SubCUTAneous BID         Vitals:    VITALS:  /71   Pulse 69   Temp 98 °F (36.7 °C) (Oral)   Resp 18   Ht 5' 9\" (1.753 m)   Wt 215 lb (97.5 kg)   SpO2 96%   BMI 31.75 kg/m²   24HR INTAKE/OUTPUT:  No intake or output data in the 24 hours ending 21 1025  CURRENT PULSE OXIMETRY:  SpO2: 96 %  24HR PULSE OXIMETRY RANGE:  SpO2  Av.1 %  Min: 92 %  Max: 97 %      EXAM:  General: No distress. Eyes: PERRL. No sclera icterus. No conjunctival injection. ENT: No discharge. Pharynx clear.+ dentures   Neck: Trachea midline. Normal thyroid. Resp: No accessory muscle use. No crackles. No wheezing. No rhonchi. diminished > left base   CV: Regular rate. Regular rhythm. No mumur or rub. ABD: Non-tender. Non-distended. No masses. No organmegaly. Normal bowel sounds. Skin: Warm and dry. No nodule on exposed extremities. No rash on exposed extremities. Lymph: No cervical LAD. No supraclavicular LAD. Ext: No joint deformity. No clubbing. No cyanosis. No edema  Neuro: Awake. Follows commands ox3, SCOTT, appropriate      Lab Results:  CBC:   Recent Labs     21  1204 21  0350   WBC 12.7* 10.9   HGB 14.5 13.4   HCT 44.4 40.1   MCV 90.1 89.5    314     BMP:   Recent Labs     21  1204 21  0350    136   K 3.9 4.3   CL 98 98   CO2 26 25   BUN 15 12   CREATININE 1.0 0.9     LFT:   Recent Labs     21  1204   ALKPHOS 91   ALT 22   AST 20   PROT 8.0   BILITOT 0.4   LABALBU 4.8     PT/INR: No results for input(s): PROTIME, INR in the last 72 hours. Cultures:  Results for Ara Luna (MRN [id number]) as of 2021 10:37   Ref. Range 2021 20:35   SARS-CoV-2, NAAT Latest Ref Range: Not Detected  Not Detected       ABG:   No results for input(s): PH, PO2, PCO2, HCO3, BE, O2SAT in the last 72 hours.     Films:  XR CHEST PORTABLE    Result Date: 12/25/2021  EXAMINATION: ONE XRAY VIEW OF THE CHEST 12/25/2021 11:54 am COMPARISON: None. HISTORY: ORDERING SYSTEM PROVIDED HISTORY: cp TECHNOLOGIST PROVIDED HISTORY: Reason for exam:->cp FINDINGS: The lungs are without acute focal process. There is no effusion or pneumothorax. The cardiomediastinal silhouette is without acute process. The osseous structures are without acute process. No acute process. CTA PULMONARY W CONTRAST    Result Date: 12/25/2021  EXAMINATION: CTA OF THE CHEST 12/25/2021 12:12 pm TECHNIQUE: CTA of the chest was performed after the administration of intravenous contrast.  Multiplanar reformatted images are provided for review. MIP images are provided for review. Dose modulation, iterative reconstruction, and/or weight based adjustment of the mA/kV was utilized to reduce the radiation dose to as low as reasonably achievable. COMPARISON: Portable chest dated 12/25/2021 HISTORY: ORDERING SYSTEM PROVIDED HISTORY: resp distress, recent lung CA, concern for PE TECHNOLOGIST PROVIDED HISTORY: Reason for exam:->resp distress, recent lung CA, concern for PE Decision Support Exception - unselect if not a suspected or confirmed emergency medical condition->Emergency Medical Condition (MA) FINDINGS: Pulmonary Arteries: Pulmonary arteries are adequately opacified for evaluation. No evidence of central intraluminal filling defect to suggest pulmonary embolism. There are small filling defects in subsegmental branches of the right and left lower lobe pulmonary arteries, larger on the right and tiny on the left. There is some motion artifact but the findings are suspicious for small pulmonary emboli. Main pulmonary artery is normal in caliber. Mediastinum: There is mild right hilar lymphadenopathy measuring up to 2.1 cm and suspected subcarinal lymphadenopathy measuring up to 3.6 x 2.2 cm. The heart and pericardium demonstrate no acute abnormality.   There is no acute abnormality of the thoracic aorta.  There is a moderate size hiatus hernia. Lungs/pleura: There is a tiny left pleural effusion with mild left basilar atelectasis. Strandy density is seen in the lingula, probably also atelectasis. However, there are subtle ground-glass opacities in the perihilar and anteromedial aspect of the right upper lobe with smaller ground-glass opacity seen more inferiorly in the left upper lobe on series 301 image 47. A semi-solid nodule is seen on series 301, image 50 in the superior segment of the right lower lobe. Other ground-glass opacities are seen in the right lower lobe. Upper Abdomen: Limited images of the upper abdomen show no acute abnormality. There are small areas of hypodensity in the superior portion of the left hepatic lobe on series 302, image 176 measuring 1.7 cm and in the medial segment of the left hepatic lobe on image 196 also measuring 1.7 cm. These are of uncertain etiology but the possibility of metastatic disease must be considered. Soft Tissues/Bones: No acute bone or soft tissue abnormality. 1. Findings suspicious for small pulmonary emboli in subsegmental branches of the right and left lower lobe pulmonary arteries. 2. Right hilar and subcarinal lymphadenopathy. This could be reactive or related to metastatic disease. Correlation with PET-CT scan may be helpful. 3. Multiple ground-glass opacities in the right lung as described above with a small 1.2 cm part solid nodule in the superior segment of the right lower lobe. The findings are suspicious for inflammatory process. The possibility of post radiation changes could also be considered. Possibility of COVID-19 pneumonia should also be considered although this appearance is not typical for COVID-19 changes. 4. The 1.2 cm nodule in the superior segment of the right lower lobe may be inflammatory although metastatic disease could also be considered. This could also be evaluated with PET-CT.  5. 2 low-density lesions in the liver measuring 1.7 cm that could represent metastatic disease. 6. Tiny left pleural effusion. Mild atelectasis in the left lower lobe and lingula. RECOMMENDATIONS: Unavailable         Assessment:  · Acute hypoxic respiratory failure  · Bilateral PE  · COPD  · Probable lung cancer   · Previous smoker       Plan:  · Currently on 2LNC. Baseline is room air, keep POX >90%, and wean as able, will need ambulatory Pulse ox prior to DC  · Currently on lovenox theraputic dose   · Plan to switch to eliquis at DC  · PE related to underlying malignancy? · Recent Positive PET and right neck bx one week ago at [facility], can not see results   · Started on symbicort one month ago please continue with colt pearson   · IS for pulmonary hygiene       Thank you for allowing us to see this patient in consultation. Please contact us with any questions. Electronically signed by FAY Loyola on 12/26/2021 at 10:25 AM     Attending Attestation Note:    Patient seen and examined with NP. I agree with above.     In addition, the following apply:    - will attain records from outside [address] [place] [address] for therapeutic lovenox at this time and will transition to eliquis in next 24-48 hours   - supportive care to continue  - wean oxygen as able    Laurel Kimbrough MD  12/26/2021  4:49 PM

## 2021-12-26 NOTE — PROGRESS NOTES
Physical Therapy    Facility/Department: [address] INTERMEDIATE      NAME: Phuc Peace  : 1955  MRN: [id number]     Order received, chart reviewed and discussed with the pt. Pt reported he is independent with functional mobility around the room and feels he does not need PT while in the hospital.  Will discontinue PT order at this time.       Santi Mix, [address]

## 2021-12-26 NOTE — H&P
negative  Respiratory ROS: no cough, shortness of breath, or wheezing  Cardiovascular ROS: no chest pain or dyspnea on exertion  Gastrointestinal ROS: no abdominal pain, change in bowel habits, or black or bloody stools  Genito-Urinary ROS: no dysuria, trouble voiding, or hematuria  Neurological ROS: no TIA or stroke symptoms  negative    Vitals:  BP (!) 151/84   Pulse 84   Temp 98.3 °F (36.8 °C) (Oral)   Resp 18   Ht 5' 9\" (1.753 m)   Wt 215 lb (97.5 kg)   SpO2 96%   BMI 31.75 kg/m²     PHYSICAL EXAM:  General:  Awake, alert, oriented X 3. Well developed, well nourished, well groomed. No apparent distress. HEENT:  Normocephalic, atraumatic. Pupils equal, round, reactive to light. No scleral icterus. No conjunctival injection. Normal lips, teeth, and gums. No nasal discharge. Neck:  Supple, FROM  Heart:  RRR, no murmurs, gallops, rubs, carotid upstroke normal, no carotid bruits  Lungs:  CTA bilaterally, bilat symmetrical expansion, no wheeze, rales, or rhonchi  Abdomen: Bowel sounds present, soft, nontender, no masses, no organomegaly, no peritoneal signs  Extremities:  No clubbing, cyanosis, or edema  Skin:  Warm and dry, no open lesions or rash  Neuro:  Cranial nerves 2-12 intact, no focal deficits  Vascular: Radial and pedal Pulses 2+  Breast: deferred  Rectal: deferred  Genitalia:  deferred      DATA:     Recent Labs     12/25/21  1204 12/26/21  0350   WBC 12.7* 10.9   HGB 14.5 13.4    314     Recent Labs     12/25/21  1204 12/26/21  0350    136   K 3.9 4.3   BUN 15 12   CREATININE 1.0 0.9     Recent Labs     12/25/21  1204   PROT 8.0     Recent Labs     12/25/21  1204   AST 20   ALT 22   ALKPHOS 91   BILITOT 0.4     No results for input(s): BNP in the last 72 hours. No results for input(s): CKTOTAL, CKMB, CKMBINDEX, TROPONINI in the last 72 hours. ASSESSMENT:      Principal Problem:    Pulmonary embolism ([address]  Resolved Problems:    * No resolved hospital problems. *        PLAN:    Admit to telemetry for evaluation of pulmonary emboli without cor pulmonale likely associated w underlying malignancy for which he is being worked up by pulm.   Eliquis 10 twice daily x7 days followed by 5 twice daily x3 months after 24 to 48 hours of Lovenox  Work-up for hypercoagulable state  Unprovoked PE  Check tumor markers  Check bilateral lower extremity ultrasounds  Await ln biopsy results   Pulmonology appreciated    DVT prophylaxis  PT OT  Discharge plan    Alice Mercado MD  12/26/2021  5:09 PM

## 2021-12-27 VITALS
BODY MASS INDEX: 31.84 KG/M2 | OXYGEN SATURATION: 97 % | HEART RATE: 70 BPM | DIASTOLIC BLOOD PRESSURE: 73 MMHG | RESPIRATION RATE: 18 BRPM | HEIGHT: 69 IN | SYSTOLIC BLOOD PRESSURE: 139 MMHG | WEIGHT: 215 LBS | TEMPERATURE: 98.1 F

## 2021-12-27 LAB
CEA: 9.9 NG/ML (ref 0–5.2)
PROSTATE SPECIFIC ANTIGEN: 0.05 NG/ML (ref 0–4)

## 2021-12-27 PROCEDURE — 6370000000 HC RX 637 (ALT 250 FOR IP): Performed by: PHYSICIAN ASSISTANT

## 2021-12-27 PROCEDURE — 2580000003 HC RX 258: Performed by: PHYSICIAN ASSISTANT

## 2021-12-27 PROCEDURE — 6360000002 HC RX W HCPCS: Performed by: PHYSICIAN ASSISTANT

## 2021-12-27 PROCEDURE — 2700000000 HC OXYGEN THERAPY PER DAY

## 2021-12-27 RX ADMIN — BUDESONIDE AND FORMOTEROL FUMARATE DIHYDRATE 2 PUFF: 80; 4.5 AEROSOL RESPIRATORY (INHALATION) at 08:23

## 2021-12-27 RX ADMIN — LEVOTHYROXINE SODIUM 112 MCG: 0.11 TABLET ORAL at 05:42

## 2021-12-27 RX ADMIN — ROSUVASTATIN CALCIUM 10 MG: 10 TABLET, FILM COATED ORAL at 08:23

## 2021-12-27 RX ADMIN — ASPIRIN 81 MG CHEWABLE TABLET 81 MG: 81 TABLET CHEWABLE at 08:23

## 2021-12-27 RX ADMIN — ENOXAPARIN SODIUM 100 MG: 100 INJECTION SUBCUTANEOUS at 08:23

## 2021-12-27 RX ADMIN — Medication 10 ML: at 08:23

## 2021-12-27 ASSESSMENT — PAIN SCALES - GENERAL
PAINLEVEL_OUTOF10: 0

## 2021-12-27 NOTE — PROGRESS NOTES
Pulsox was __95___% on room air at rest.   Ambulated patient on room air. Oxygen saturation was __89__% on room air while ambulating.

## 2021-12-27 NOTE — PROGRESS NOTES
Pulmonary Progress Note    Admit Date: 2021  Requesting Physician: Donna Mcrae MD    SUBJECTIVE:  Feels better today - breathing much better, but feels odd sensation at times when deep breathing  O2 2L nc with good pox - room air at home  Asking when he can go home      Medications:     sodium chloride        aspirin  81 mg Oral Daily    budesonide-formoterol  2 puff Inhalation BID    levothyroxine  112 mcg Oral Daily    rosuvastatin  10 mg Oral Daily    sodium chloride flush  10 mL IntraVENous 2 times per day    enoxaparin  1 mg/kg SubCUTAneous BID         Vitals:    VITALS:  /77   Pulse 70   Temp 98.2 °F (36.8 °C) (Oral)   Resp 18   Ht 5' 9\" (1.753 m)   Wt 215 lb (97.5 kg)   SpO2 94%   BMI 31.75 kg/m²   24HR INTAKE/OUTPUT:  No intake or output data in the 24 hours ending 21 0844  CURRENT PULSE OXIMETRY:  SpO2: 94 %  24HR PULSE OXIMETRY RANGE:  SpO2  Av.5 %  Min: 87 %  Max: 96 %      EXAM:  General: No distress. Eyes: PERRL. No sclera icterus. No conjunctival injection. ENT: No discharge. Pharynx clear.+ dentures   Neck: Trachea midline. Normal thyroid. Resp: No accessory muscle use. No crackles. No wheezing. No rhonchi. diminished > left base   CV: Regular rate. Regular rhythm. No mumur or rub. ABD: Non-tender. Non-distended. No masses. No organmegaly. Normal bowel sounds. Skin: Warm and dry. No nodule on exposed extremities. No rash on exposed extremities. Lymph: No cervical LAD. No supraclavicular LAD. Ext: No joint deformity. No clubbing. No cyanosis. No edema  Neuro: Awake.  Follows commands ox3, SCOTT, appropriate      Lab Results:  CBC:   Recent Labs     21  1204 21  0350   WBC 12.7* 10.9   HGB 14.5 13.4   HCT 44.4 40.1   MCV 90.1 89.5    314     BMP:   Recent Labs     21  1204 21  0350    136   K 3.9 4.3   CL 98 98   CO2 26 25   BUN 15 12   CREATININE 1.0 0.9     LFT:   Recent Labs     21  1204   ALKPHOS 91 ALT 22   AST 20   PROT 8.0   BILITOT 0.4   LABALBU 4.8     PT/INR: No results for input(s): PROTIME, INR in the last 72 hours. Cultures:  Results for Mariela Marshall (MRN [id number]) as of 12/26/2021 10:37   Ref. Range 12/25/2021 20:35   SARS-CoV-2, NAAT Latest Ref Range: Not Detected  Not Detected   Results for Mariela Marshall (MRN [id number]) as of 12/26/2021    Ref. Range 12/26/2021 10:48   Influenza A by PCR Latest Ref Range: Not Detected  Not Detected   Influenza B by PCR Latest Ref Range: Not Detected  Not Detected   Adenovirus by PCR Latest Ref Range: Not Detected  Not Detected   Coronavirus 229E by PCR Latest Ref Range: Not Detected  Not Detected   Coronavirus HKU1 by PCR Latest Ref Range: Not Detected  Not Detected   Coronavirus NL63 by PCR Latest Ref Range: Not Detected  Not Detected   Coronavirus OC43 by PCR Latest Ref Range: Not Detected  Not Detected   Human Metapneumovirus by PCR Latest Ref Range: Not Detected  Not Detected   Human Rhinovirus/Enterovirus by PCR Latest Ref Range: Not Detected  Not Detected   Parainfluenza Virus 1 by PCR Latest Ref Range: Not Detected  Not Detected   Parainfluenza Virus 2 by PCR Latest Ref Range: Not Detected  Not Detected   Parainfluenza Virus 3 by PCR Latest Ref Range: Not Detected  Not Detected   Parainfluenza Virus 4 by PCR Latest Ref Range: Not Detected  Not Detected   Respiratory Syncytial Virus by PCR Latest Ref Range: Not Detected  Not Detected   Bordetella parapertussis by PCR Latest Ref Range: Not Detected  Not Detected   Chlamydophilia pneumoniae by PCR Latest Ref Range: Not Detected  Not Detected   Mycoplasma pneumoniae by PCR Latest Ref Range: Not Detected  Not Detected   SARS-CoV-2, PCR Latest Ref Range: Not Detected  Not Detected   Bordetella pertussis by PCR Latest Ref Range: Not Detected  Not Detected       ABG:   No results for input(s): PH, PO2, PCO2, HCO3, BE, O2SAT in the last 72 hours.     Films:  XR CHEST PORTABLE    Result Date: 12/25/2021: The lungs are without acute focal process. There is no effusion or pneumothorax. The cardiomediastinal silhouette is without acute process. The osseous structures are without acute process. No acute process. CTA PULMONARY W CONTRAST    Result Date: 12/25/2021: Pulmonary Arteries: Pulmonary arteries are adequately opacified for evaluation. No evidence of central intraluminal filling defect to suggest pulmonary embolism. There are small filling defects in subsegmental branches of the right and left lower lobe pulmonary arteries, larger on the right and tiny on the left. There is some motion artifact but the findings are suspicious for small pulmonary emboli. Main pulmonary artery is normal in caliber. Mediastinum: There is mild right hilar lymphadenopathy measuring up to 2.1 cm and suspected subcarinal lymphadenopathy measuring up to 3.6 x 2.2 cm. The heart and pericardium demonstrate no acute abnormality. There is no acute abnormality of the thoracic aorta. There is a moderate size hiatus hernia. Lungs/pleura: There is a tiny left pleural effusion with mild left basilar atelectasis. Strandy density is seen in the lingula, probably also atelectasis. However, there are subtle ground-glass opacities in the perihilar and anteromedial aspect of the right upper lobe with smaller ground-glass opacity seen more inferiorly in the left upper lobe on series 301 image 47. A semi-solid nodule is seen on series 301, image 50 in the superior segment of the right lower lobe. Other ground-glass opacities are seen in the right lower lobe. Upper Abdomen: Limited images of the upper abdomen show no acute abnormality. There are small areas of hypodensity in the superior portion of the left hepatic lobe on series 302, image 176 measuring 1.7 cm and in the medial segment of the left hepatic lobe on image 196 also measuring 1.7 cm. These are of uncertain etiology but the possibility of metastatic disease must be considered.  Soft Tissues/Bones: No acute bone or soft tissue abnormality. 1. Findings suspicious for small pulmonary emboli in subsegmental branches of the right and left lower lobe pulmonary arteries. 2. Right hilar and subcarinal lymphadenopathy. This could be reactive or related to metastatic disease. Correlation with PET-CT scan may be helpful. 3. Multiple ground-glass opacities in the right lung as described above with a small 1.2 cm part solid nodule in the superior segment of the right lower lobe. The findings are suspicious for inflammatory process. The possibility of post radiation changes could also be considered. Possibility of COVID-19 pneumonia should also be considered although this appearance is not typical for COVID-19 changes. 4. The 1.2 cm nodule in the superior segment of the right lower lobe may be inflammatory although metastatic disease could also be considered. This could also be evaluated with PET-CT. 5. 2 low-density lesions in the liver measuring 1.7 cm that could represent metastatic disease. 6. Tiny left pleural effusion. Mild atelectasis in the left lower lobe and lingula. 12/26/21 BLE US: Impression No evidence of DVT in either lower extremity. Assessment:  · Acute hypoxic respiratory failure  · Bilateral PE  · COPD  · Probable lung cancer   · Previous smoker       Plan:  · Currently on 2LNC. Baseline is room air, keep POX >90%, and wean as able, will need ambulatory pulse ox prior to DC  · Change lovenox to Eliquis  · PE possibly related to underlying malignancy with recent positive PET and right neck bx one week ago at [facility]. Results not available at this time   · CEA elevated at 9.9, nl PSA  · BLE US negative for DVT  · Started on symbicort one month ago please continue with colt pearson - resume Symbicort on d/c  · IS for pulmonary hygiene       Gregor Fothergill, APRN - CNP  12/27/2021  8:44 AM    Attending Attestation Note:    Patient seen and examined with NP.   I agree with above.    In addition, the following apply:    - eliquis 10 mg PO BID x 7 days then 5 mg PO BID  - follow up outpatient   - supportive care to continue   - home oxygen evaluation   - OK to D/C from pulmonary perspective     Klarissa Rangel MD  12/27/2021  4:15 PM

## 2021-12-27 NOTE — PROGRESS NOTES
Occupational Therapy    OT eval and treat orders received and attempt made. Pt reports that he is able to complete own self care and walk to and from bathroom. Pt with no OT needs at this time and did not report any further questions or concerns. OT orders discontinued and pt agreed.     Carmen Aguirre, OTR/L

## 2021-12-27 NOTE — PLAN OF CARE
Problem: Tissue Perfusion - Cardiopulmonary, Altered:  Goal: Absence of angina  Description: Absence of angina  12/27/2021 1744 by Porsche Garza RN  Outcome: Completed  12/27/2021 1027 by Porsche Garza RN  Outcome: Ongoing  Goal: Hemodynamic stability will improve  Description: Hemodynamic stability will improve  12/27/2021 1744 by Porsche Garza RN  Outcome: Completed  12/27/2021 1027 by Porsche Garza RN  Outcome: Ongoing

## 2021-12-27 NOTE — CARE COORDINATION
Social work / Discharge Planning:       Per therapy patient is independent. Currently on 2 liters of oxygen. Order noted for pulse ox testing. Social work will follow.   Electronically signed by BUBBA Malagon on 12/27/2021 at 1:39 PM

## 2021-12-30 LAB — CA 19-9: 67 U/ML (ref 0–37)

## 2021-12-30 NOTE — DISCHARGE SUMMARY
Physician Discharge Summary     Patient ID:  Henrry [facility]  [id number]  73 y.o.  1955    Admit date: 12/25/2021    Discharge date and time: 12/27/2021    Admission Diagnoses:   Patient Active Problem List   Diagnosis    Pulmonary embolism [facility])       Discharge Diagnoses: Pulmonary Embolism    Consults: pulmonary/intensive care    Procedures: None    Hospital Course: The patient is a 73 y.o. male of Rosalina Zavaleta MD with significant past medical history of prostate cancer, hyperlipidemia and thyroid disease who presents with above complaints of chest pain shortness of breath and weakness. Pt also had recent lymph node bx of right neck d/t lymphadenopathy due to positive pet scan . Emergency department evaluation revealed pulmonary emboli and patient was subsequently admitted. Admit to telemetry for evaluation of pulmonary emboli without cor pulmonale likely associated w underlying malignancy for which he is being worked up by pulm. Eliquis 10 twice daily x7 days followed by 5 twice daily x3 months after 24 to 48 hours of Lovenox  Work-up for hypercoagulable state  Unprovoked PE  Check tumor markers  Check bilateral lower extremity ultrasounds  Await ln biopsy results   Pulmonology appreciated    Patient discharged home in stable conditions with medications listed below. Patient instructed to follow up with all consultants and PCP within 3 weeks of discharge. No results for input(s): WBC, HGB, HCT, PLT in the last 72 hours. No results for input(s): NA, K, CL, CO2, BUN, CREATININE, GLU, CALCIUM in the last 72 hours. XR CHEST PORTABLE    Result Date: 12/25/2021  EXAMINATION: ONE XRAY VIEW OF THE CHEST 12/25/2021 11:54 am COMPARISON: None. HISTORY: ORDERING SYSTEM PROVIDED HISTORY: cp TECHNOLOGIST PROVIDED HISTORY: Reason for exam:->cp FINDINGS: The lungs are without acute focal process. There is no effusion or pneumothorax. The cardiomediastinal silhouette is without acute process.  The osseous structures are without acute process. No acute process. CTA PULMONARY W CONTRAST    Result Date: 12/25/2021  EXAMINATION: CTA OF THE CHEST 12/25/2021 12:12 pm TECHNIQUE: CTA of the chest was performed after the administration of intravenous contrast.  Multiplanar reformatted images are provided for review. MIP images are provided for review. Dose modulation, iterative reconstruction, and/or weight based adjustment of the mA/kV was utilized to reduce the radiation dose to as low as reasonably achievable. COMPARISON: Portable chest dated 12/25/2021 HISTORY: ORDERING SYSTEM PROVIDED HISTORY: resp distress, recent lung CA, concern for PE TECHNOLOGIST PROVIDED HISTORY: Reason for exam:->resp distress, recent lung CA, concern for PE Decision Support Exception - unselect if not a suspected or confirmed emergency medical condition->Emergency Medical Condition (MA) FINDINGS: Pulmonary Arteries: Pulmonary arteries are adequately opacified for evaluation. No evidence of central intraluminal filling defect to suggest pulmonary embolism. There are small filling defects in subsegmental branches of the right and left lower lobe pulmonary arteries, larger on the right and tiny on the left. There is some motion artifact but the findings are suspicious for small pulmonary emboli. Main pulmonary artery is normal in caliber. Mediastinum: There is mild right hilar lymphadenopathy measuring up to 2.1 cm and suspected subcarinal lymphadenopathy measuring up to 3.6 x 2.2 cm. The heart and pericardium demonstrate no acute abnormality. There is no acute abnormality of the thoracic aorta. There is a moderate size hiatus hernia. Lungs/pleura: There is a tiny left pleural effusion with mild left basilar atelectasis. Strandy density is seen in the lingula, probably also atelectasis.   However, there are subtle ground-glass opacities in the perihilar and anteromedial aspect of the right upper lobe with smaller ground-glass opacity seen more inferiorly in the left upper lobe on series 301 image 47. A semi-solid nodule is seen on series 301, image 50 in the superior segment of the right lower lobe. Other ground-glass opacities are seen in the right lower lobe. Upper Abdomen: Limited images of the upper abdomen show no acute abnormality. There are small areas of hypodensity in the superior portion of the left hepatic lobe on series 302, image 176 measuring 1.7 cm and in the medial segment of the left hepatic lobe on image 196 also measuring 1.7 cm. These are of uncertain etiology but the possibility of metastatic disease must be considered. Soft Tissues/Bones: No acute bone or soft tissue abnormality. 1. Findings suspicious for small pulmonary emboli in subsegmental branches of the right and left lower lobe pulmonary arteries. 2. Right hilar and subcarinal lymphadenopathy. This could be reactive or related to metastatic disease. Correlation with PET-CT scan may be helpful. 3. Multiple ground-glass opacities in the right lung as described above with a small 1.2 cm part solid nodule in the superior segment of the right lower lobe. The findings are suspicious for inflammatory process. The possibility of post radiation changes could also be considered. Possibility of COVID-19 pneumonia should also be considered although this appearance is not typical for COVID-19 changes. 4. The 1.2 cm nodule in the superior segment of the right lower lobe may be inflammatory although metastatic disease could also be considered. This could also be evaluated with PET-CT. 5. 2 low-density lesions in the liver measuring 1.7 cm that could represent metastatic disease. 6. Tiny left pleural effusion. Mild atelectasis in the left lower lobe and lingula.  RECOMMENDATIONS: Unavailable     US DUP LOWER EXTREMITIES BILATERAL VENOUS    Result Date: 12/26/2021  EXAMINATION: DUPLEX VENOUS ULTRASOUND OF THE BILATERAL LOWER [id number]/26/2021 5:55 pm TECHNIQUE: Duplex ultrasound using B-mode/gray scaled imaging, Doppler spectral analysis and color flow Doppler was obtained of the deep venous structures of the lower bilateral extremities. COMPARISON: CT PE study from December 25, 2021 HISTORY: ORDERING SYSTEM PROVIDED HISTORY: PE TECHNOLOGIST PROVIDED HISTORY: Reason for exam:->PE What reading provider will be dictating this exam?->CRC FINDINGS: The visualized veins of the bilateral lower extremities are patent and free of echogenic thrombus. The veins demonstrate good compressibility with normal color flow study and spectral analysis. No evidence of DVT in either lower extremity. RECOMMENDATIONS: Unavailable       Discharge Exam:    HEENT: NCAT,  PERRLA, No JVD  Heart:  RRR, no murmurs, gallops, or rubs. Lungs:  CTA bilaterally, no wheeze, rales or rhonchi  Abd: bowel sounds present, nontender, nondistended, no masses  Extrem:  No clubbing, cyanosis, or edema    Disposition: home     Patient Condition at Discharge: Stable    Patient Instructions:      Medication List      START taking these medications    apixaban starter pack 5 MG Tbpk tablet  Commonly known as: Eliquis DVT/PE Starter Pack  Take 2 tabs by mouth twice daily for 6 days then 1 tab by mouth twice daily .         CONTINUE taking these medications    aspirin 81 MG chewable tablet     esomeprazole Magnesium 20 MG Pack  Commonly known as: NEXIUM     fish oil 1000 MG Cpdr     levothyroxine 112 MCG tablet  Commonly known as: SYNTHROID     rosuvastatin 10 MG tablet  Commonly known as: CRESTOR     Symbicort 80-4.5 MCG/ACT Aero  Generic drug: budesonide-formoterol           Where to Get Your Medications      These medications were sent to [address], [address] [place] [id number]    Phone: [phone]   · apixaban starter pack 5 MG Tbpk tablet       Activity: activity as tolerated  Diet: cardiac diet    Pt has been advised to: Follow-up with Ryan Vidal MD in 1 week.   Follow-up with consultants as recommended by them    Note that over 30 minutes was spent in preparing discharge papers, discussing discharge with patient, medication review, etc.    Signed:  Barbie Vigil MD  12/30/2021  2:20 PM

## 2024-08-12 ENCOUNTER — TELEPHONE (OUTPATIENT)
Age: 69
End: 2024-08-12

## 2024-08-12 NOTE — TELEPHONE ENCOUNTER
Notify the patient I will see him but I can see him probably till the week of the 29th as I am going out of town tomorrow for the wedding tell him to call his cardiologist to see if he can see him sooner than the 6 and also his pulmonologist.  Ronn thank you for understanding

## 2024-08-12 NOTE — TELEPHONE ENCOUNTER
Patient went to [facility] urgent care this morning. He had gone because he had breathing issues. His pulse ox was 92 upon arrival and then went up 97 when he left. His BP and heart rate were also elevated. They gave him a nebulizer treatment with albuterol and advised to follow up with PCP. He sees a pulmonologist, Dr. Bell with [facility] and has an appt on 10/24. He also saw him on 7/17. He also has an appt with Dr. Wu on 9/6. Do you want to see him?

## 2024-08-14 NOTE — TELEPHONE ENCOUNTER
Spoke with patient this morning and let him know that Dr. Rees will see him the last week in August. I will call him back to get scheduled as soon as I can make adjustments in schedule.

## 2024-08-27 LAB
ESTIMATED AVERAGE GLUCOSE: NORMAL
HBA1C MFR BLD: 6.5 %

## 2024-08-29 ASSESSMENT — ENCOUNTER SYMPTOMS
RECTAL PAIN: 0
EYES NEGATIVE: 1
ALLERGIC/IMMUNOLOGIC NEGATIVE: 1
RESPIRATORY NEGATIVE: 1
GASTROINTESTINAL NEGATIVE: 1
FACIAL SWELLING: 0
SINUS PAIN: 0

## 2024-08-30 ENCOUNTER — OFFICE VISIT (OUTPATIENT)
Age: 69
End: 2024-08-30

## 2024-08-30 VITALS
HEART RATE: 80 BPM | HEIGHT: 70 IN | SYSTOLIC BLOOD PRESSURE: 130 MMHG | OXYGEN SATURATION: 95 % | WEIGHT: 192.2 LBS | BODY MASS INDEX: 27.52 KG/M2 | DIASTOLIC BLOOD PRESSURE: 78 MMHG

## 2024-08-30 DIAGNOSIS — J44.1 COPD WITH ACUTE EXACERBATION (HCC): ICD-10-CM

## 2024-08-30 DIAGNOSIS — I10 BENIGN HYPERTENSION: ICD-10-CM

## 2024-08-30 DIAGNOSIS — E78.2 MIXED HYPERLIPIDEMIA: ICD-10-CM

## 2024-08-30 DIAGNOSIS — J43.2 CENTRILOBULAR EMPHYSEMA (HCC): ICD-10-CM

## 2024-08-30 DIAGNOSIS — I26.99 ACUTE PULMONARY EMBOLISM WITHOUT ACUTE COR PULMONALE, UNSPECIFIED PULMONARY EMBOLISM TYPE (HCC): ICD-10-CM

## 2024-08-30 DIAGNOSIS — K21.9 GASTROESOPHAGEAL REFLUX DISEASE WITHOUT ESOPHAGITIS: ICD-10-CM

## 2024-08-30 DIAGNOSIS — E03.9 PRIMARY HYPOTHYROIDISM: ICD-10-CM

## 2024-08-30 DIAGNOSIS — C34.91 ADENOCARCINOMA, LUNG, RIGHT (HCC): Primary | ICD-10-CM

## 2024-08-30 RX ORDER — ALBUTEROL SULFATE 90 UG/1
2 AEROSOL, METERED RESPIRATORY (INHALATION) EVERY 4 HOURS PRN
COMMUNITY
Start: 2024-07-17

## 2024-08-30 RX ORDER — METFORMIN HCL 500 MG
500 TABLET, EXTENDED RELEASE 24 HR ORAL 2 TIMES DAILY
COMMUNITY
Start: 2024-05-14

## 2024-08-30 RX ORDER — APIXABAN 2.5 MG/1
2.5 TABLET, FILM COATED ORAL 2 TIMES DAILY
COMMUNITY
Start: 2024-07-24

## 2024-08-30 RX ORDER — LEVOTHYROXINE SODIUM 175 UG/1
175 TABLET ORAL DAILY
COMMUNITY
Start: 2024-06-11

## 2024-08-30 RX ORDER — LANOLIN ALCOHOL/MO/W.PET/CERES
400 CREAM (GRAM) TOPICAL DAILY
COMMUNITY

## 2024-08-30 SDOH — ECONOMIC STABILITY: INCOME INSECURITY: HOW HARD IS IT FOR YOU TO PAY FOR THE VERY BASICS LIKE FOOD, HOUSING, MEDICAL CARE, AND HEATING?: NOT HARD AT ALL

## 2024-08-30 SDOH — ECONOMIC STABILITY: FOOD INSECURITY: WITHIN THE PAST 12 MONTHS, THE FOOD YOU BOUGHT JUST DIDN'T LAST AND YOU DIDN'T HAVE MONEY TO GET MORE.: NEVER TRUE

## 2024-08-30 SDOH — ECONOMIC STABILITY: FOOD INSECURITY: WITHIN THE PAST 12 MONTHS, YOU WORRIED THAT YOUR FOOD WOULD RUN OUT BEFORE YOU GOT MONEY TO BUY MORE.: NEVER TRUE

## 2024-08-30 ASSESSMENT — PATIENT HEALTH QUESTIONNAIRE - PHQ9
SUM OF ALL RESPONSES TO PHQ QUESTIONS 1-9: 0
1. LITTLE INTEREST OR PLEASURE IN DOING THINGS: NOT AT ALL
SUM OF ALL RESPONSES TO PHQ9 QUESTIONS 1 & 2: 0
SUM OF ALL RESPONSES TO PHQ QUESTIONS 1-9: 0
2. FEELING DOWN, DEPRESSED OR HOPELESS: NOT AT ALL

## 2024-08-30 NOTE — PROGRESS NOTES
may need to go on an aerosol machine.  He was looked at for sleep apnea he does not have sleep apnea but he does have some desaturation at night he is due to see his pulmonologist in the fall after he has a pulmonary function test I reviewed all of his recent evaluations including a chest x-ray in June a CT of his chest and abdomen these are favorable with all of the treatments he has had overall he is doing quite well he is watched very closely by his oncologist his endocrinologist his pulmonologist his cardiologist and he just saw an ENT doctor all.  I reviewed all of these.    Review of Systems   Constitutional: Negative.    HENT: Negative.  Negative for dental problem, drooling, facial swelling, mouth sores and sinus pain.    Eyes: Negative.    Respiratory: Negative.     Cardiovascular: Negative.    Gastrointestinal: Negative.  Negative for rectal pain.   Endocrine: Negative.    Genitourinary: Negative.  Negative for decreased urine volume.   Musculoskeletal: Negative.    Skin: Negative.  Negative for wound.   Allergic/Immunologic: Negative.    Neurological: Negative.    Hematological: Negative.    Psychiatric/Behavioral: Negative.            Objective   Physical Exam  Constitutional:       General: He is not in acute distress.     Appearance: Normal appearance. He is not ill-appearing, toxic-appearing or diaphoretic.   HENT:      Head: Normocephalic and atraumatic.      Right Ear: Tympanic membrane, ear canal and external ear normal. There is no impacted cerumen.      Left Ear: Tympanic membrane, ear canal and external ear normal. There is no impacted cerumen.      Nose: Nose normal.      Mouth/Throat:      Mouth: Mucous membranes are moist.      Pharynx: Oropharynx is clear.   Eyes:      General: No scleral icterus.        Right eye: No discharge.         Left eye: No discharge.      Extraocular Movements: Extraocular movements intact.      Conjunctiva/sclera: Conjunctivae normal.      Pupils: Pupils are

## 2024-09-08 RX ORDER — APIXABAN 2.5 MG/1
2.5 TABLET, FILM COATED ORAL 2 TIMES DAILY
Qty: 60 TABLET | Refills: 5 | Status: SHIPPED | OUTPATIENT
Start: 2024-09-08

## 2024-11-12 SDOH — HEALTH STABILITY: PHYSICAL HEALTH: ON AVERAGE, HOW MANY DAYS PER WEEK DO YOU ENGAGE IN MODERATE TO STRENUOUS EXERCISE (LIKE A BRISK WALK)?: 0 DAYS

## 2024-11-12 ASSESSMENT — LIFESTYLE VARIABLES
HOW MANY STANDARD DRINKS CONTAINING ALCOHOL DO YOU HAVE ON A TYPICAL DAY: 1 OR 2
HOW OFTEN DO YOU HAVE A DRINK CONTAINING ALCOHOL: MONTHLY OR LESS
HOW OFTEN DO YOU HAVE A DRINK CONTAINING ALCOHOL: 2
HOW MANY STANDARD DRINKS CONTAINING ALCOHOL DO YOU HAVE ON A TYPICAL DAY: 1
HOW OFTEN DO YOU HAVE SIX OR MORE DRINKS ON ONE OCCASION: 1

## 2024-11-12 ASSESSMENT — PATIENT HEALTH QUESTIONNAIRE - PHQ9
SUM OF ALL RESPONSES TO PHQ QUESTIONS 1-9: 0
2. FEELING DOWN, DEPRESSED OR HOPELESS: NOT AT ALL
SUM OF ALL RESPONSES TO PHQ9 QUESTIONS 1 & 2: 0
1. LITTLE INTEREST OR PLEASURE IN DOING THINGS: NOT AT ALL

## 2024-11-15 ENCOUNTER — OFFICE VISIT (OUTPATIENT)
Age: 69
End: 2024-11-15

## 2024-11-15 VITALS
DIASTOLIC BLOOD PRESSURE: 66 MMHG | OXYGEN SATURATION: 98 % | TEMPERATURE: 97.7 F | SYSTOLIC BLOOD PRESSURE: 111 MMHG | WEIGHT: 197.2 LBS | HEART RATE: 69 BPM | HEIGHT: 70 IN | RESPIRATION RATE: 16 BRPM | BODY MASS INDEX: 28.23 KG/M2

## 2024-11-15 DIAGNOSIS — J43.1 PANLOBULAR EMPHYSEMA (HCC): ICD-10-CM

## 2024-11-15 DIAGNOSIS — Z00.00 MEDICARE ANNUAL WELLNESS VISIT, SUBSEQUENT: Primary | ICD-10-CM

## 2024-11-15 DIAGNOSIS — I10 BENIGN HYPERTENSION: ICD-10-CM

## 2024-11-15 DIAGNOSIS — C34.90 ADENOCARCINOMA OF LUNG, STAGE 4, UNSPECIFIED LATERALITY (HCC): ICD-10-CM

## 2024-11-15 DIAGNOSIS — E78.2 MIXED HYPERLIPIDEMIA: ICD-10-CM

## 2024-11-15 DIAGNOSIS — E11.9 DIABETES MELLITUS WITHOUT COMPLICATION (HCC): ICD-10-CM

## 2024-11-15 DIAGNOSIS — Z85.46 HISTORY OF PROSTATE CANCER: ICD-10-CM

## 2024-11-15 RX ORDER — FLUTICASONE FUROATE, UMECLIDINIUM BROMIDE AND VILANTEROL TRIFENATATE 200; 62.5; 25 UG/1; UG/1; UG/1
1 POWDER RESPIRATORY (INHALATION) DAILY
COMMUNITY

## 2024-11-15 NOTE — PROGRESS NOTES
Medicare Annual Wellness Visit    Dejon Saldana is here for Medicare AWV ([facility] Subsequent   Pt states doing ok.  No recent labs  Pt discontinued symbicort and started trelegy.  He is currently on 2 liters O2 at night. ), Hypothyroidism, and Hyperlipidemia    Assessment & Plan   Medicare annual wellness visit, subsequent  Benign hypertension  Diabetes mellitus without complication (HCC)  Mixed hyperlipidemia  Adenocarcinoma of lung, stage 4, unspecified laterality (HCC)  History of prostate cancer  Panlobular emphysema (HCC)  Recommendations for Preventive Services Due: see orders and patient instructions/AVS.  Recommended screening schedule for the next 5-10 years is provided to the patient in written form: see Patient Instructions/AVS.   Follow-up will be in 6 months he has a slip to get lipids and an A1c level.  He sees his oncologist every 3 to 4 months his scans are updated regularly I will see him back in in 6 months his vital signs are stable today immunizations are up-to-date he.  Colonoscopy was done in 2017 is now due in 2027  Return in 6 months (on 5/15/2025) for Medicare Annual Wellness Visit in 1 year.     Subjective     Patient comes in today with a chief complaint of a wellness visit.  I reviewed all of his medical issues.  He has underlying stage IV adenocarcinoma of the lung diabetes prostate cancer hypertension hyperlipidemia he had recent CT of his abdomen and and chest in October.  Flu shots up-to-date his diabetes is stable he is due for an A1c soon he is due for lipid panel soon.  He is wearing oxygen at night for underlying COPD.  He is primarily just complaining of fatigue.  His labs have been recently stable bowel and bladder function are fine his exam was stable today.  Colonoscopy from 2017 is now due in 2027  Patient's complete Health Risk Assessment and screening values have been reviewed and are found in Flowsheets. The following problems were reviewed today and where indicated follow up

## 2024-11-15 NOTE — PATIENT INSTRUCTIONS
Learning About Being Active as an Older Adult  Why is being active important as you get older?     Being active is one of the best things you can do for your health. And it's never too late to start. Being active--or getting active, if you aren't already--has definite benefits. It can:  Give you more energy,  Keep your mind sharp.  Improve balance to reduce your risk of falls.  Help you manage chronic illness with fewer medicines.  No matter how old you are, how fit you are, or what health problems you have, there is a form of activity that will work for you. And the more physical activity you can do, the better your overall health will be.  What kinds of activity can help you stay healthy?  Being more active will make your daily activities easier. Physical activity includes planned exercise and things you do in daily life. There are four types of activity:  Aerobic.  Doing aerobic activity makes your heart and lungs strong.  Includes walking, dancing, and gardening.  Aim for at least 2½ hours spread throughout the week.  It improves your energy and can help you sleep better.  Muscle-strengthening.  This type of activity can help maintain muscle and strengthen bones.  Includes climbing stairs, using resistance bands, and lifting or carrying heavy loads.  Aim for at least twice a week.  It can help protect the knees and other joints.  Stretching.  Stretching gives you better range of motion in joints and muscles.  Includes upper arm stretches, calf stretches, and gentle yoga.  Aim for at least twice a week, preferably after your muscles are warmed up from other activities.  It can help you function better in daily life.  Balancing.  This helps you stay coordinated and have good posture.  Includes heel-to-toe walking, quirino chi, and certain types of yoga.  Aim for at least 3 days a week.  It can reduce your risk of falling.  Even if you have a hard time meeting the recommendations, it's better to be more active

## 2025-01-06 LAB
ALBUMIN URINE, EXTERNAL: 54.9
CHOLESTEROL, TOTAL: NORMAL
CHOLESTEROL/HDL RATIO: NORMAL
CREATININE, URINE, EXTERNAL: 231
ESTIMATED AVERAGE GLUCOSE: NORMAL
HBA1C MFR BLD: 6.2 %
HDLC SERPL-MCNC: NORMAL MG/DL
LDL CHOLESTEROL: NORMAL
MICROALBUMIN/CREAT UR: 23.8 MG/G{CREAT}
NONHDLC SERPL-MCNC: NORMAL MG/DL
TRIGL SERPL-MCNC: NORMAL MG/DL
VLDLC SERPL CALC-MCNC: NORMAL MG/DL

## 2025-03-11 DIAGNOSIS — I26.99 ACUTE PULMONARY EMBOLISM WITHOUT ACUTE COR PULMONALE, UNSPECIFIED PULMONARY EMBOLISM TYPE (HCC): Primary | ICD-10-CM

## 2025-03-11 RX ORDER — APIXABAN 2.5 MG/1
2.5 TABLET, FILM COATED ORAL 2 TIMES DAILY
Qty: 60 TABLET | Refills: 11 | Status: SHIPPED | OUTPATIENT
Start: 2025-03-11

## 2025-03-11 NOTE — TELEPHONE ENCOUNTER
Name of Medication(s) Requested:  Requested Prescriptions     Pending Prescriptions Disp Refills    apixaban (ELIQUIS) 2.5 MG TABS tablet [Pharmacy Med Name: Eliquis 2.5 MG Oral Tablet] 60 tablet 11     Sig: Take 1 tablet by mouth twice daily       Medication is on current medication list Yes    Dosage and directions were verified? Yes    Quantity verified: 90 day supply     Pharmacy Verified?  Yes    Last Appointment:  11/15/2024    Future appts:  Future Appointments   Date Time Provider Department Center   5/16/2025  9:30 AM Chinmay Rees, DO [facility] DE[facility]   11/17/2025  8:30 AM Chinmay Rees, DO [facility]        (If no appt send self scheduling link. .REFILLAPPT)  Scheduling request sent?     [] Yes  [x] No    Does patient need updated?  [] Yes  [x] No

## 2025-04-16 ENCOUNTER — TELEPHONE (OUTPATIENT)
Age: 70
End: 2025-04-16

## 2025-04-16 NOTE — TELEPHONE ENCOUNTER
Patient asked if you would call him when you can. He has some questions he would like to discuss with you    [phone]

## 2025-05-06 ENCOUNTER — TELEPHONE (OUTPATIENT)
Age: 70
End: 2025-05-06

## 2025-05-06 DIAGNOSIS — J43.1 PANLOBULAR EMPHYSEMA (HCC): Primary | ICD-10-CM

## 2025-05-06 NOTE — TELEPHONE ENCOUNTER
He would like you to call him after hours. He is having issues with breathing he would like to talk to you about.   He does have an upcoming appointment with you on 5/16/25.  Thank you

## 2025-05-07 NOTE — PROGRESS NOTES
I spoke to Joselito maddox . His COPD is getting worse. He wants to change from his [facility] pulmonary Dr to some one at [facility] [place]. I placed the consult.

## 2025-05-08 LAB
ALBUMIN: NORMAL
ALP BLD-CCNC: NORMAL U/L
ALT SERPL-CCNC: NORMAL U/L
ANION GAP SERPL CALCULATED.3IONS-SCNC: NORMAL MMOL/L
AST SERPL-CCNC: NORMAL U/L
BILIRUB SERPL-MCNC: NORMAL MG/DL
BUN BLDV-MCNC: NORMAL MG/DL
CALCIUM SERPL-MCNC: NORMAL MG/DL
CHLORIDE BLD-SCNC: NORMAL MMOL/L
CO2: NORMAL
CREAT SERPL-MCNC: NORMAL MG/DL
GFR, ESTIMATED: 94
GLUCOSE BLD-MCNC: NORMAL MG/DL
POTASSIUM SERPL-SCNC: NORMAL MMOL/L
SODIUM BLD-SCNC: NORMAL MMOL/L
TOTAL PROTEIN: NORMAL

## 2025-05-12 ENCOUNTER — TELEPHONE (OUTPATIENT)
Age: 70
End: 2025-05-12

## 2025-05-12 NOTE — TELEPHONE ENCOUNTER
Patient called and asked to speak to you. I told him it would be after patients were done. I will print this and leave on your desk    [phone]

## 2025-05-13 DIAGNOSIS — J44.1 CHRONIC OBSTRUCTIVE PULMONARY DISEASE WITH ACUTE EXACERBATION (HCC): Primary | ICD-10-CM

## 2025-05-13 SDOH — ECONOMIC STABILITY: FOOD INSECURITY: WITHIN THE PAST 12 MONTHS, YOU WORRIED THAT YOUR FOOD WOULD RUN OUT BEFORE YOU GOT MONEY TO BUY MORE.: NEVER TRUE

## 2025-05-13 SDOH — ECONOMIC STABILITY: FOOD INSECURITY: WITHIN THE PAST 12 MONTHS, THE FOOD YOU BOUGHT JUST DIDN'T LAST AND YOU DIDN'T HAVE MONEY TO GET MORE.: NEVER TRUE

## 2025-05-13 SDOH — ECONOMIC STABILITY: INCOME INSECURITY: IN THE LAST 12 MONTHS, WAS THERE A TIME WHEN YOU WERE NOT ABLE TO PAY THE MORTGAGE OR RENT ON TIME?: NO

## 2025-05-13 SDOH — ECONOMIC STABILITY: TRANSPORTATION INSECURITY
IN THE PAST 12 MONTHS, HAS THE LACK OF TRANSPORTATION KEPT YOU FROM MEDICAL APPOINTMENTS OR FROM GETTING MEDICATIONS?: NO

## 2025-05-13 SDOH — ECONOMIC STABILITY: TRANSPORTATION INSECURITY
IN THE PAST 12 MONTHS, HAS LACK OF TRANSPORTATION KEPT YOU FROM MEETINGS, WORK, OR FROM GETTING THINGS NEEDED FOR DAILY LIVING?: NO

## 2025-05-16 ENCOUNTER — OFFICE VISIT (OUTPATIENT)
Age: 70
End: 2025-05-16

## 2025-05-16 ENCOUNTER — TELEPHONE (OUTPATIENT)
Age: 70
End: 2025-05-16

## 2025-05-16 VITALS
RESPIRATION RATE: 20 BRPM | HEART RATE: 88 BPM | WEIGHT: 199.4 LBS | SYSTOLIC BLOOD PRESSURE: 134 MMHG | TEMPERATURE: 97.7 F | BODY MASS INDEX: 28.55 KG/M2 | DIASTOLIC BLOOD PRESSURE: 75 MMHG | HEIGHT: 70 IN | OXYGEN SATURATION: 96 %

## 2025-05-16 DIAGNOSIS — E78.2 MIXED HYPERLIPIDEMIA: ICD-10-CM

## 2025-05-16 DIAGNOSIS — J43.2 CENTRILOBULAR EMPHYSEMA (HCC): ICD-10-CM

## 2025-05-16 DIAGNOSIS — J44.1 COPD WITH ACUTE EXACERBATION (HCC): ICD-10-CM

## 2025-05-16 DIAGNOSIS — E11.9 DIABETES MELLITUS WITHOUT COMPLICATION (HCC): ICD-10-CM

## 2025-05-16 DIAGNOSIS — I26.99 ACUTE PULMONARY EMBOLISM WITHOUT ACUTE COR PULMONALE, UNSPECIFIED PULMONARY EMBOLISM TYPE (HCC): Primary | ICD-10-CM

## 2025-05-16 DIAGNOSIS — I10 BENIGN HYPERTENSION: ICD-10-CM

## 2025-05-16 DIAGNOSIS — C34.90 ADENOCARCINOMA OF LUNG, STAGE 4, UNSPECIFIED LATERALITY (HCC): ICD-10-CM

## 2025-05-16 DIAGNOSIS — Z85.46 HISTORY OF PROSTATE CANCER: ICD-10-CM

## 2025-05-16 RX ORDER — PREDNISONE 10 MG/1
TABLET ORAL
Qty: 60 TABLET | Refills: 0 | Status: SHIPPED | OUTPATIENT
Start: 2025-05-16

## 2025-05-16 ASSESSMENT — PATIENT HEALTH QUESTIONNAIRE - PHQ9
SUM OF ALL RESPONSES TO PHQ QUESTIONS 1-9: 0
1. LITTLE INTEREST OR PLEASURE IN DOING THINGS: NOT AT ALL
SUM OF ALL RESPONSES TO PHQ QUESTIONS 1-9: 0
2. FEELING DOWN, DEPRESSED OR HOPELESS: NOT AT ALL
SUM OF ALL RESPONSES TO PHQ QUESTIONS 1-9: 0
SUM OF ALL RESPONSES TO PHQ QUESTIONS 1-9: 0

## 2025-05-16 NOTE — ASSESSMENT & PLAN NOTE
Chronic, at goal (stable), continue current treatment plan, medication adherence emphasized, and lifestyle modifications recommended    Orders:    apixaban (ELIQUIS) 2.5 MG TABS tablet; Take 1 tablet by mouth 2 times daily

## 2025-05-16 NOTE — TELEPHONE ENCOUNTER
He said they do have pulmonary rehab at [facility], Phone: [phone] or phone [phone], He did not get a fax number   Please refer him

## 2025-05-16 NOTE — ASSESSMENT & PLAN NOTE
Chronic, at goal (stable), continue current treatment plan, medication adherence emphasized, and lifestyle modifications recommended    Orders:    External Referral To Pulmonary Rehab

## 2025-05-16 NOTE — PROGRESS NOTES
Dejon Saldana (:  1955) is a 70 y.o. male,Established patient, here for evaluation of the following chief complaint(s):  Shortness of Breath (Pt is here for 6 month f/u and still complains of shortness of breath on exertion.  ), Hypertension (Pt does have recent labs in EPIC.  ), Hyperlipidemia, Diabetes, Cholesterol Problem, COPD, and Lung Cancer         Assessment & Plan  Acute pulmonary embolism without acute cor pulmonale, unspecified pulmonary embolism type (HCC)   Chronic, at goal (stable), continue current treatment plan, medication adherence emphasized, and lifestyle modifications recommended    Orders:    apixaban (ELIQUIS) 2.5 MG TABS tablet; Take 1 tablet by mouth 2 times daily    Adenocarcinoma of lung, stage 4, unspecified laterality (HCC)   Monitored by specialist- no acute findings meriting change in the plan         Mixed hyperlipidemia   Chronic, at goal (stable), continue current treatment plan, medication adherence emphasized, and lifestyle modifications recommended         Benign hypertension   Chronic, at goal (stable), continue current treatment plan, medication adherence emphasized, and lifestyle modifications recommended         Centrilobular emphysema (HCC)   Chronic, at goal (stable), continue current treatment plan, medication adherence emphasized, and lifestyle modifications recommended    Orders:    External Referral To Pulmonary Rehab    History of prostate cancer   Monitored by specialist- no acute findings meriting change in the plan         COPD with acute exacerbation (HCC)   Chronic, at goal (stable), continue current treatment plan, medication adherence emphasized, and lifestyle modifications recommended    Orders:    predniSONE (DELTASONE) 10 MG tablet; 50 mg x 4 days, 40 mg x 4 days, 30 mg x 4 days, 20 x 4 days and 10 x 4 days with food    External Referral To Pulmonary Rehab    Diabetes mellitus without complication (HCC)   Chronic, at goal (stable), continue current

## 2025-05-16 NOTE — ASSESSMENT & PLAN NOTE
Chronic, at goal (stable), continue current treatment plan, medication adherence emphasized, and lifestyle modifications recommended    Orders:    predniSONE (DELTASONE) 10 MG tablet; 50 mg x 4 days, 40 mg x 4 days, 30 mg x 4 days, 20 x 4 days and 10 x 4 days with food    External Referral To Pulmonary Rehab

## 2025-05-19 ENCOUNTER — TELEPHONE (OUTPATIENT)
Age: 70
End: 2025-05-19

## 2025-05-19 NOTE — TELEPHONE ENCOUNTER
Patient's wife Krysten called. She would like to speak directly to you regarding her . Can you call her? I told her it would most likely be after patients are done.    Krysten- [phone]    I will print this & put it on your desk

## 2025-05-21 RX ORDER — ROSUVASTATIN CALCIUM 10 MG/1
10 TABLET, COATED ORAL NIGHTLY
Qty: 90 TABLET | Refills: 3 | Status: SHIPPED | OUTPATIENT
Start: 2025-05-21

## 2025-08-02 PROBLEM — J43.9 NOCTURNAL HYPOXEMIA DUE TO EMPHYSEMA (HCC): Status: ACTIVE | Noted: 2025-08-02

## 2025-08-02 PROBLEM — J70.1: Status: ACTIVE | Noted: 2025-08-02

## 2025-08-02 PROBLEM — J98.6 ACQUIRED ELEVATED HEMIDIAPHRAGM: Status: ACTIVE | Noted: 2025-08-02

## 2025-08-02 PROBLEM — G47.36 NOCTURNAL HYPOXEMIA DUE TO EMPHYSEMA (HCC): Status: ACTIVE | Noted: 2025-08-02

## 2025-08-02 PROBLEM — T50.995A ADDITIONAL MEDICATION REQUIRED FOR ADDITIVE EFFECT: Status: ACTIVE | Noted: 2025-08-02
